# Patient Record
Sex: FEMALE | Race: WHITE | NOT HISPANIC OR LATINO | Employment: FULL TIME | URBAN - METROPOLITAN AREA
[De-identification: names, ages, dates, MRNs, and addresses within clinical notes are randomized per-mention and may not be internally consistent; named-entity substitution may affect disease eponyms.]

---

## 2018-10-30 ENCOUNTER — EVALUATION (OUTPATIENT)
Dept: PHYSICAL THERAPY | Facility: MEDICAL CENTER | Age: 48
End: 2018-10-30
Payer: COMMERCIAL

## 2018-10-30 DIAGNOSIS — M25.511 RIGHT SHOULDER PAIN, UNSPECIFIED CHRONICITY: Primary | ICD-10-CM

## 2018-10-30 PROCEDURE — 97110 THERAPEUTIC EXERCISES: CPT | Performed by: PHYSICAL THERAPIST

## 2018-10-30 PROCEDURE — G8990 OTHER PT/OT CURRENT STATUS: HCPCS | Performed by: PHYSICAL THERAPIST

## 2018-10-30 PROCEDURE — 97161 PT EVAL LOW COMPLEX 20 MIN: CPT | Performed by: PHYSICAL THERAPIST

## 2018-10-30 PROCEDURE — G8991 OTHER PT/OT GOAL STATUS: HCPCS | Performed by: PHYSICAL THERAPIST

## 2018-10-30 NOTE — PROGRESS NOTES
PT Evaluation     Today's date: 10/30/2018  Patient name: Marion Angulo  : 1970  MRN: [id number]  Referring provider: Allene Nageotte, DO  Dx:   Encounter Diagnosis     ICD-10-CM    1  Right shoulder pain, unspecified chronicity M25 511                   Assessment  Impairments: abnormal coordination, abnormal muscle firing, abnormal muscle tone, abnormal or restricted ROM, abnormal movement, activity intolerance, impaired physical strength, lacks appropriate home exercise program and pain with function  Functional limitations: putting a seatbelt on, brushing her hair, donning/doffing clothing, overhead activity  Symptom irritability: low  Assessment details: Marion Angulo is a pleasant 50 y o  female who presents with R shoulder pain after a fall in 2018  Patient is a poor historian and cannot remember if she landed on an outstretched arm or if her arm was bent and hit her counter before hitting the floor  Referral is not necessary at this time based on examination results  Primary movement impairment diagnosis of shoulder hypomobility resulting in increased tension on soft tissues surrounding the shoulder, limiting her ability to don/doff clothing and brush her hair  Soft tissue restrictions in the upper trapezius and scapular stabilizers impair shoulder girdle mechanics during overhead reaching  Patient would benefit from skilled PT to improve ROM and strength to return to household chores 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      Impairments include:  1) Decreased shoulder FLX/ABD AROM and PROM--> addressing with manual therapy, stretching, and exercise  2) Decreased shoulder FLX/ABD strength--> addressing with progressive exercise  3) Limited shoulder AROM functional ER (behind the head) and functional IR (behind the back)--> addressing with manual therapy, stretching, and exercise  4) R shoulder swelling--> addressing with icing HEP        Barriers to therapy: none  Understanding of Dx/Px/POC: good   Prognosis: good  Prognosis details: Positive prognostic indicators include positive attitude toward recovery    Goals  ST  Patient will be independent with home exercise program in 2 weeks  2  Patient will increase R shoulder ROM to be able to brush her hair in 2 weeks  LT  Patient will improve R shoulder FLX/ABD AROM to be able to reach overhead objects in 8 weeks  2  Patient will improve R shoulder strength to be able to lift objects at her job in 8 weeks  3  Patient will improve behind the back reaching with her R shoulder to be able to don/doff clothing in 8 weeks  4  Patient will be able to reach across her body to put her seatbelt on with minimal difficulty in 8 weeks  Plan  Patient would benefit from: skilled PT  Referral necessary: No  Planned modality interventions: thermotherapy: hydrocollator packs and cryotherapy  Planned therapy interventions: activity modification, joint mobilization, manual therapy, motor coordination training, neuromuscular re-education, patient education, self care, therapeutic activities, therapeutic exercise, graded activity, home exercise program, behavior modification, Mae taping, massage and flexibility  Frequency: 2x week  Duration in weeks: 8  Treatment plan discussed with: patient  Plan details: Prognosis above is given PT services 2x/week tapering to 1x/week over the next 2 months and home program adherence  Subjective Evaluation    History of Present Illness  Mechanism of injury: The is a 49 yo female presenting with R shoulder pain after a fall 1 month ago  She slipped when turning in her kitchen and either landed with her shoulder against a counter or on an outstretched hand  She cannot remember how her arm hit the floor  The pain got slightly better since the incident, but it increased 1-2 weeks ago when picking up a laundry basket   The pain is located in her R shoulder, and it sometimes shoots up into the side of her neck  She is unable to lift objects overhead and has difficulty donning/doffing clothing, putting her seatbelt on, and doing her hair  She does have a hx of neck pain from 10 years ago    Quality of life: good    Pain  Current pain ratin  At best pain ratin  At worst pain ratin  Location: R anterior shoulder, R scapular border, R deltoid   Quality: sharp  Relieving factors: medications, ice and heat (motrin as needed)  Aggravating factors: overhead activity and lifting  Progression: improved    Hand dominance: right      Diagnostic Tests  X-ray: normal  Treatments  Previous treatment: medication (medrol dose pack)  Patient Goals  Patient goals for therapy: decreased pain  Patient goal: being able to do her hair, put her seatbelt on, don/doff clothing        Objective     Palpation     Additional Palpation Details  TTP R scapular border, R bicipital groove    Cervical/Thoracic Screen   Cervical range of motion within normal limits with the following exceptions: R cervical ROT caused R shoulder pain (patient notes this is not her current symptoms)    Neurological Testing     Reflexes   Left   Biceps (C5/C6): normal (2+)  Brachioradialis (C6): normal (2+)  Triceps (C7): normal (2+)  Green's reflex: negative    Right   Biceps (C5/C6): normal (2+)  Brachioradialis (C6): normal (2+)  Triceps (C7): normal (2+)  Green's reflex: negative    Additional Neurological Details  - wrist clonus b/l    Active Range of Motion   Left Shoulder   Flexion: 175 degrees   Abduction: 140 degrees   External rotation 0°: 87 degrees   External rotation BTH: T2   Internal rotation 0°: 90 degrees   Internal rotation BTB: T7     Right Shoulder   Flexion: 150 degrees with pain  Abduction: 115 degrees with pain  External rotation 0°: 75 degrees   External rotation BTH: C7   Internal rotation 0°: 80 degrees   Internal rotation BTB: T12 with pain    Passive Range of Motion   Left Shoulder   Normal passive range of motion    Right Shoulder   Flexion: 135 degrees with pain  Abduction: 100 degrees   External rotation 45°: WFL  Internal rotation 45°: WFL    Strength/Myotome Testing     Left Shoulder     Planes of Motion   Flexion: 3+   Abduction: 3+   External rotation at 0°: 4   Internal rotation at 0°: 4     Right Shoulder     Planes of Motion   Flexion: 2   Abduction: 2   External rotation at 0°: 4   Internal rotation at 0°: 4     Tests     Left Shoulder   Negative active compression ([place]), drop arm, empty can, external rotation lag sign, Hawkin's, horn blower, internal rotation lag sign, lift-off, Neer's and Speed's  Right Shoulder   Positive Speed's  Negative active compression ([place]), drop arm, empty can, external rotation lag sign, Hawkin's, horn blower, internal rotation lag sign, lift-off and Neer's       General Comments     Shoulder Comments   Slight swelling R anterior shoulder      Flowsheet Rows      Most Recent Value   PT/OT G-Codes   Current Score  51   Projected Score  69   FOTO information reviewed  Yes   Assessment Type  Evaluation   G code set  Other PT/OT Primary   Other PT Primary Current Status ()  CK   Other PT Primary Goal Status ()  CJ          Precautions: none    Daily Treatment Diary     Manual  10/30                                                                                 Exercise Diary              Stick FLX AAROM 20x5"            Table slides 30x5"                                                                                                                                                                                                                                                          Modalities              Ice to R shoulder HEP

## 2018-10-30 NOTE — LETTER
2018    Becki Joyner DO  [address] [place] [address]    Patient: Bertrand Floyd   YOB: 1970   Date of Visit: 10/30/2018     Encounter Diagnosis     ICD-10-CM    1  Right shoulder pain, unspecified chronicity M25 511        Dear Dr Nita Gallego:    Please review the attached Plan of Care from [facility] recent visit  Please verify that you agree therapy should continue by signing the attached document and sending it back to our office  If you have any questions or concerns, please don't hesitate to call  Sincerely,    Roderick Chandler, PT      Referring Provider:      I certify that I have read the below Plan of Care and certify the need for these services furnished under this plan of treatment while under my care  Becki Joyner DO  [facility] [address]: [phone]          PT Evaluation     Today's date: 10/30/2018  Patient name: Bertrand Floyd  : 1970  MRN: [id number]  Referring provider: Franceen Kanner, DO  Dx:   Encounter Diagnosis     ICD-10-CM    1  Right shoulder pain, unspecified chronicity M25 511                   Assessment  Impairments: abnormal coordination, abnormal muscle firing, abnormal muscle tone, abnormal or restricted ROM, abnormal movement, activity intolerance, impaired physical strength, lacks appropriate home exercise program and pain with function  Functional limitations: putting a seatbelt on, brushing her hair, donning/doffing clothing, overhead activity  Symptom irritability: low  Assessment details: Bertrand Floyd is a pleasant 50 y o  female who presents with R shoulder pain after a fall in 2018  Patient is a poor historian and cannot remember if she landed on an outstretched arm or if her arm was bent and hit her counter before hitting the floor  Referral is not necessary at this time based on examination results   Primary movement impairment diagnosis of shoulder hypomobility resulting in increased tension on soft tissues surrounding the shoulder, limiting her ability to don/doff clothing and brush her hair  Soft tissue restrictions in the upper trapezius and scapular stabilizers impair shoulder girdle mechanics during overhead reaching  Patient would benefit from skilled PT to improve ROM and strength to return to household chores 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  Impairments include:  1) Decreased shoulder FLX/ABD AROM and PROM--> addressing with manual therapy, stretching, and exercise  2) Decreased shoulder FLX/ABD strength--> addressing with progressive exercise  3) Limited shoulder AROM functional ER (behind the head) and functional IR (behind the back)--> addressing with manual therapy, stretching, and exercise  4) R shoulder swelling--> addressing with icing HEP        Barriers to therapy: none  Understanding of Dx/Px/POC: good   Prognosis: good  Prognosis details: Positive prognostic indicators include positive attitude toward recovery    Goals  ST  Patient will be independent with home exercise program in 2 weeks  2  Patient will increase R shoulder ROM to be able to brush her hair in 2 weeks  LT  Patient will improve R shoulder FLX/ABD AROM to be able to reach overhead objects in 8 weeks  2  Patient will improve R shoulder strength to be able to lift objects at her job in 8 weeks  3  Patient will improve behind the back reaching with her R shoulder to be able to don/doff clothing in 8 weeks  4  Patient will be able to reach across her body to put her seatbelt on with minimal difficulty in 8 weeks        Plan  Patient would benefit from: skilled PT  Referral necessary: No  Planned modality interventions: thermotherapy: hydrocollator packs and cryotherapy  Planned therapy interventions: activity modification, joint mobilization, manual therapy, motor coordination training, neuromuscular re-education, patient education, self care, therapeutic activities, therapeutic exercise, graded activity, home exercise program, behavior modification, Mae taping, massage and flexibility  Frequency: 2x week  Duration in weeks: 8  Treatment plan discussed with: patient  Plan details: Prognosis above is given PT services 2x/week tapering to 1x/week over the next 2 months and home program adherence  Subjective Evaluation    History of Present Illness  Mechanism of injury: The is a 49 yo female presenting with R shoulder pain after a fall 1 month ago  She slipped when turning in her kitchen and either landed with her shoulder against a counter or on an outstretched hand  She cannot remember how her arm hit the floor  The pain got slightly better since the incident, but it increased 1-2 weeks ago when picking up a laundry basket  The pain is located in her R shoulder, and it sometimes shoots up into the side of her neck  She is unable to lift objects overhead and has difficulty donning/doffing clothing, putting her seatbelt on, and doing her hair  She does have a hx of neck pain from 10 years ago    Quality of life: good    Pain  Current pain ratin  At best pain ratin  At worst pain ratin  Location: R anterior shoulder, R scapular border, R deltoid   Quality: sharp  Relieving factors: medications, ice and heat (motrin as needed)  Aggravating factors: overhead activity and lifting  Progression: improved    Hand dominance: right      Diagnostic Tests  X-ray: normal  Treatments  Previous treatment: medication (medrol dose pack)  Patient Goals  Patient goals for therapy: decreased pain  Patient goal: being able to do her hair, put her seatbelt on, don/doff clothing        Objective     Palpation     Additional Palpation Details  TTP R scapular border, R bicipital groove    Cervical/Thoracic Screen   Cervical range of motion within normal limits with the following exceptions: R cervical ROT caused R shoulder pain (patient notes this is not her current symptoms)    Neurological Testing     Reflexes   Left   Biceps (C5/C6): normal (2+)  Brachioradialis (C6): normal (2+)  Triceps (C7): normal (2+)  Green's reflex: negative    Right   Biceps (C5/C6): normal (2+)  Brachioradialis (C6): normal (2+)  Triceps (C7): normal (2+)  Green's reflex: negative    Additional Neurological Details  - wrist clonus b/l    Active Range of Motion   Left Shoulder   Flexion: 175 degrees   Abduction: 140 degrees   External rotation 0°:  87 degrees   External rotation BTH: T2   Internal rotation 0°:  90 degrees   Internal rotation BTB: T7     Right Shoulder   Flexion: 150 degrees with pain  Abduction: 115 degrees with pain  External rotation 0°:  75 degrees   External rotation BTH: C7   Internal rotation 0°:  80 degrees   Internal rotation BTB: T12 with pain    Passive Range of Motion   Left Shoulder   Normal passive range of motion    Right Shoulder   Flexion: 135 degrees with pain  Abduction: 100 degrees   External rotation 45°:  WFL  Internal rotation 45°:  WFL    Strength/Myotome Testing     Left Shoulder     Planes of Motion   Flexion: 3+   Abduction: 3+   External rotation at 0°:  4   Internal rotation at 0°:  4     Right Shoulder     Planes of Motion   Flexion: 2   Abduction: 2   External rotation at 0°:  4   Internal rotation at 0°:  4     Tests     Left Shoulder   Negative active compression ([place]), drop arm, empty can, external rotation lag sign, Hawkin's, horn blower, internal rotation lag sign, lift-off, Neer's and Speed's  Right Shoulder   Positive Speed's  Negative active compression ([place]), drop arm, empty can, external rotation lag sign, Hawkin's, horn blower, internal rotation lag sign, lift-off and Neer's       General Comments     Shoulder Comments   Slight swelling R anterior shoulder      Flowsheet Rows      Most Recent Value   PT/OT G-Codes   Current Score  51   Projected Score  69   FOTO information reviewed  Yes Assessment Type  Evaluation   G code set  Other PT/OT Primary   Other PT Primary Current Status ()  CK   Other PT Primary Goal Status ()  CJ          Precautions: none    Daily Treatment Diary     Manual  10/30                                                                                 Exercise Diary              Stick FLX AAROM 20x5"            Table slides 30x5"                                                                                                                                                                                                                                                          Modalities

## 2018-10-30 NOTE — LETTER
2018    Niraj De La Cruz DO  [address] [place] [address]    Patient: Jo Ortiz   YOB: 1970   Date of Visit: 10/30/2018     Encounter Diagnosis     ICD-10-CM    1  Right shoulder pain, unspecified chronicity M25 511        Dear Dr Nicol Castanon:    Please review the attached Plan of Care from [facility] recent visit  Please verify that you agree therapy should continue by signing the attached document and sending it back to our office  If you have any questions or concerns, please don't hesitate to call  Sincerely,    Gretchen Brown, PT      Referring Provider:      I certify that I have read the below Plan of Care and certify the need for these services furnished under this plan of treatment while under my care  Niraj De La Cruz DO  [facility] [address]: [phone]          PT Evaluation     Today's date: 10/30/2018  Patient name: Jo Ortiz  : 1970  MRN: [id number]  Referring provider: Makenzie Chaudhry DO  Dx:   Encounter Diagnosis     ICD-10-CM    1  Right shoulder pain, unspecified chronicity M25 511                   Assessment  Impairments: abnormal coordination, abnormal muscle firing, abnormal muscle tone, abnormal or restricted ROM, abnormal movement, activity intolerance, impaired physical strength, lacks appropriate home exercise program and pain with function  Functional limitations: putting a seatbelt on, brushing her hair, donning/doffing clothing, overhead activity  Symptom irritability: low  Assessment details: Jo Ortiz is a pleasant 50 y o  female who presents with R shoulder pain after a fall in 2018  Patient is a poor historian and cannot remember if she landed on an outstretched arm or if her arm was bent and hit her counter before hitting the floor  Referral is not necessary at this time based on examination results   Primary movement impairment diagnosis of shoulder hypomobility resulting in increased tension on soft tissues surrounding the shoulder, limiting her ability to don/doff clothing and brush her hair  Soft tissue restrictions in the upper trapezius and scapular stabilizers impair shoulder girdle mechanics during overhead reaching  Patient would benefit from skilled PT to improve ROM and strength to return to household chores 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  Impairments include:  1) Decreased shoulder FLX/ABD AROM and PROM--> addressing with manual therapy, stretching, and exercise  2) Decreased shoulder FLX/ABD strength--> addressing with progressive exercise  3) Limited shoulder AROM functional ER (behind the head) and functional IR (behind the back)--> addressing with manual therapy, stretching, and exercise  4) R shoulder swelling--> addressing with icing HEP        Barriers to therapy: none  Understanding of Dx/Px/POC: good   Prognosis: good  Prognosis details: Positive prognostic indicators include positive attitude toward recovery    Goals  ST  Patient will be independent with home exercise program in 2 weeks  2  Patient will increase R shoulder ROM to be able to brush her hair in 2 weeks  LT  Patient will improve R shoulder FLX/ABD AROM to be able to reach overhead objects in 8 weeks  2  Patient will improve R shoulder strength to be able to lift objects at her job in 8 weeks  3  Patient will improve behind the back reaching with her R shoulder to be able to don/doff clothing in 8 weeks  4  Patient will be able to reach across her body to put her seatbelt on with minimal difficulty in 8 weeks        Plan  Patient would benefit from: skilled PT  Referral necessary: No  Planned modality interventions: thermotherapy: hydrocollator packs and cryotherapy  Planned therapy interventions: activity modification, joint mobilization, manual therapy, motor coordination training, neuromuscular re-education, patient education, self care, therapeutic activities, therapeutic exercise, graded activity, home exercise program, behavior modification, Mae taping, massage and flexibility  Frequency: 2x week  Duration in weeks: 8  Treatment plan discussed with: patient  Plan details: Prognosis above is given PT services 2x/week tapering to 1x/week over the next 2 months and home program adherence  Subjective Evaluation    History of Present Illness  Mechanism of injury: The is a 49 yo female presenting with R shoulder pain after a fall 1 month ago  She slipped when turning in her kitchen and either landed with her shoulder against a counter or on an outstretched hand  She cannot remember how her arm hit the floor  The pain got slightly better since the incident, but it increased 1-2 weeks ago when picking up a laundry basket  The pain is located in her R shoulder, and it sometimes shoots up into the side of her neck  She is unable to lift objects overhead and has difficulty donning/doffing clothing, putting her seatbelt on, and doing her hair  She does have a hx of neck pain from 10 years ago    Quality of life: good    Pain  Current pain ratin  At best pain ratin  At worst pain ratin  Location: R anterior shoulder, R scapular border, R deltoid   Quality: sharp  Relieving factors: medications, ice and heat (motrin as needed)  Aggravating factors: overhead activity and lifting  Progression: improved    Hand dominance: right      Diagnostic Tests  X-ray: normal  Treatments  Previous treatment: medication (medrol dose pack)  Patient Goals  Patient goals for therapy: decreased pain  Patient goal: being able to do her hair, put her seatbelt on, don/doff clothing        Objective     Palpation     Additional Palpation Details  TTP R scapular border, R bicipital groove    Cervical/Thoracic Screen   Cervical range of motion within normal limits with the following exceptions: R cervical ROT caused R shoulder pain (patient notes this is not her current symptoms)    Neurological Testing     Reflexes   Left   Biceps (C5/C6): normal (2+)  Brachioradialis (C6): normal (2+)  Triceps (C7): normal (2+)  Green's reflex: negative    Right   Biceps (C5/C6): normal (2+)  Brachioradialis (C6): normal (2+)  Triceps (C7): normal (2+)  Green's reflex: negative    Additional Neurological Details  - wrist clonus b/l    Active Range of Motion   Left Shoulder   Flexion: 175 degrees   Abduction: 140 degrees   External rotation 0°:  87 degrees   External rotation BTH: T2   Internal rotation 0°:  90 degrees   Internal rotation BTB: T7     Right Shoulder   Flexion: 150 degrees with pain  Abduction: 115 degrees with pain  External rotation 0°:  75 degrees   External rotation BTH: C7   Internal rotation 0°:  80 degrees   Internal rotation BTB: T12 with pain    Passive Range of Motion   Left Shoulder   Normal passive range of motion    Right Shoulder   Flexion: 135 degrees with pain  Abduction: 100 degrees   External rotation 45°:  WFL  Internal rotation 45°:  WFL    Strength/Myotome Testing     Left Shoulder     Planes of Motion   Flexion: 3+   Abduction: 3+   External rotation at 0°:  4   Internal rotation at 0°:  4     Right Shoulder     Planes of Motion   Flexion: 2   Abduction: 2   External rotation at 0°:  4   Internal rotation at 0°:  4     Tests     Left Shoulder   Negative active compression (Griggs), drop arm, empty can, external rotation lag sign, Hawkin's, horn blower, internal rotation lag sign, lift-off, Neer's and Speed's  Right Shoulder   Positive Speed's  Negative active compression (Griggs), drop arm, empty can, external rotation lag sign, Hawkin's, horn blower, internal rotation lag sign, lift-off and Neer's       General Comments     Shoulder Comments   Slight swelling R anterior shoulder      Flowsheet Rows      Most Recent Value   PT/OT G-Codes   Current Score  51   Projected Score  69   FOTO information reviewed  Yes Assessment Type  Evaluation   G code set  Other PT/OT Primary   Other PT Primary Current Status ()  CK   Other PT Primary Goal Status ()  CJ          Precautions: none    Daily Treatment Diary     Manual  10/30                                                                                 Exercise Diary              Stick FLX AAROM 20x5"            Table slides 30x5"                                                                                                                                                                                                                                                          Modalities              Ice to R shoulder HEP

## 2018-10-31 ENCOUNTER — TRANSCRIBE ORDERS (OUTPATIENT)
Dept: PHYSICAL THERAPY | Facility: MEDICAL CENTER | Age: 48
End: 2018-10-31

## 2018-10-31 DIAGNOSIS — M25.511 PAIN, JOINT, SHOULDER, RIGHT: Primary | ICD-10-CM

## 2018-11-02 ENCOUNTER — OFFICE VISIT (OUTPATIENT)
Dept: PHYSICAL THERAPY | Facility: MEDICAL CENTER | Age: 48
End: 2018-11-02
Payer: COMMERCIAL

## 2018-11-02 DIAGNOSIS — M25.511 RIGHT SHOULDER PAIN, UNSPECIFIED CHRONICITY: Primary | ICD-10-CM

## 2018-11-02 PROCEDURE — 97110 THERAPEUTIC EXERCISES: CPT | Performed by: PHYSICAL THERAPIST

## 2018-11-02 PROCEDURE — 97140 MANUAL THERAPY 1/> REGIONS: CPT | Performed by: PHYSICAL THERAPIST

## 2018-11-02 NOTE — PROGRESS NOTES
Daily Note     Today's date: 2018  Patient name: Celine Del Toro  : 1970  MRN: [id number]  Referring provider: Kathie Barrow DO  Dx:   Encounter Diagnosis     ICD-10-CM    1  Right shoulder pain, unspecified chronicity M25 511                   Subjective: Patient is feeling much better since last visit  She has decreased pain in her shoulder and increased motion  Most of her pain is located near her R shoulder blade  Objective: See treatment diary below    Precautions: none    Daily Treatment Diary     Manual  10/30 11/2           GH PROM FLX/ABD/ER/IR  KP           GH posterior/inferior glides  KP Gr  II           R scapular mobs  KP                                         Exercise Diary              Stick FLX AAROM 20x5"            Table slides 30x5"            Prone T  20 L side           Prone ext  20 R side           Stick scaption  20x5"           Standing tband rows  30x5" red                                                                                                                                                                                                     Modalities              Ice to R shoulder HEP 10'                                           Assessment: Shoulder FLX PROM nearly [facility] today after PROM and mobilizations  Remains limited in shoulder ABD PROM, but the motion is improved since last visit  Behind the head and behind the back reaching symmetrical bilaterally today  Unable to complete prone Ts or prone rows on R side due to pain in anterior shoulder  Added prone ext and stick scaption to HEP  Tolerated treatment well  Patient would benefit from continued PT to improve scapular control to alleviate tension during overhead activity during ADL      Impairments include:  1) Decreased shoulder FLX/ABD AROM and PROM--> addressing with manual therapy, stretching, and exercise- improving  2) Decreased shoulder FLX/ABD strength--> addressing with progressive exercise  3) Limited shoulder AROM functional ER (behind the head) and functional IR (behind the back)--> addressing with manual therapy, stretching, and exercise- improving  4) R shoulder swelling--> addressing with icing HEP- improving    Goals  ST  Patient will be independent with home exercise program in 2 weeks  2  Patient will increase R shoulder ROM to be able to brush her hair in 2 weeks  LT  Patient will improve R shoulder FLX/ABD AROM to be able to reach overhead objects in 8 weeks  2  Patient will improve R shoulder strength to be able to lift objects at her job in 8 weeks  3  Patient will improve behind the back reaching with her R shoulder to be able to don/doff clothing in 8 weeks  4  Patient will be able to reach across her body to put her seatbelt on with minimal difficulty in 8 weeks  Plan: Continue per plan of care

## 2018-11-06 ENCOUNTER — OFFICE VISIT (OUTPATIENT)
Dept: PHYSICAL THERAPY | Facility: MEDICAL CENTER | Age: 48
End: 2018-11-06
Payer: COMMERCIAL

## 2018-11-06 DIAGNOSIS — M25.511 RIGHT SHOULDER PAIN, UNSPECIFIED CHRONICITY: Primary | ICD-10-CM

## 2018-11-06 PROCEDURE — 97140 MANUAL THERAPY 1/> REGIONS: CPT | Performed by: PHYSICAL THERAPIST

## 2018-11-06 PROCEDURE — 97110 THERAPEUTIC EXERCISES: CPT | Performed by: PHYSICAL THERAPIST

## 2018-11-06 NOTE — PROGRESS NOTES
Daily Note     Today's date: 2018  Patient name: Celine Del Toro  : 1970  MRN: [id number]  Referring provider: Kathie Barrow DO  Dx:   Encounter Diagnosis     ICD-10-CM    1  Right shoulder pain, unspecified chronicity M25 511            Addendum from : Patient is discharged from PT due to inactivity  Subjective: Patient reports she has decreased pain and increased motion  She was able to work with no pain yesterday  She has pain near her R deltoid at night because she sleeps on that side with her arm bent  She also notices pulling in her R shoulder when reaching behind her with her arm straight  Objective: See treatment diary below    Precautions: none    Daily Treatment Diary     Manual  10/30 11/2 11/6          GH PROM FLX/ABD/ER/IR  KP KP          GH posterior/inferior glides  KP Gr  II KP Gr  II          R scapular mobs  KP KP                                        Exercise Diary              Stick FLX AAROM 20x5"            Table slides 30x5"            Prone T  20 L side           Prone ext  20 R side 2x10 b/l          Stick scaption  20x5"           Standing tband rows  30x5" red 30x5" red          Supine serratus punches   3x10 3#           Standing tband robberies   30x5" red          Sidelying ER   3x10 2#                                                                                                                                                             Modalities              Ice to R shoulder HEP 10' 10'                                            Assessment: R shoulder PROM continues to be slightly limited in FLX/ABD  Motion improved after PROM and post/inf GH glides  Unable to complete prone T, row, or standing tband ext today due to pulling in R deltoid  Tolerated treatment well  Patient would benefit from continued PT to improve scapular mechanics and alleviate tension during ADL      Impairments include:  1) Decreased shoulder FLX/ABD AROM and PROM--> addressing with manual therapy, stretching, and exercise- improving  2) Decreased shoulder FLX/ABD strength--> addressing with progressive exercise  3) Limited shoulder AROM functional ER (behind the head) and functional IR (behind the back)--> addressing with manual therapy, stretching, and exercise- improving  4) R shoulder swelling--> addressing with icing HEP- improving    Goals  ST  Patient will be independent with home exercise program in 2 weeks  2  Patient will increase R shoulder ROM to be able to brush her hair in 2 weeks  LT  Patient will improve R shoulder FLX/ABD AROM to be able to reach overhead objects in 8 weeks  2  Patient will improve R shoulder strength to be able to lift objects at her job in 8 weeks  3  Patient will improve behind the back reaching with her R shoulder to be able to don/doff clothing in 8 weeks  4  Patient will be able to reach across her body to put her seatbelt on with minimal difficulty in 8 weeks  Plan: Continue per plan of care

## 2018-11-09 ENCOUNTER — APPOINTMENT (OUTPATIENT)
Dept: PHYSICAL THERAPY | Facility: MEDICAL CENTER | Age: 48
End: 2018-11-09
Payer: COMMERCIAL

## 2018-11-13 ENCOUNTER — APPOINTMENT (OUTPATIENT)
Dept: PHYSICAL THERAPY | Facility: MEDICAL CENTER | Age: 48
End: 2018-11-13
Payer: COMMERCIAL

## 2018-11-16 ENCOUNTER — APPOINTMENT (OUTPATIENT)
Dept: PHYSICAL THERAPY | Facility: MEDICAL CENTER | Age: 48
End: 2018-11-16
Payer: COMMERCIAL

## 2020-12-07 ENCOUNTER — CLINICAL SUPPORT (OUTPATIENT)
Dept: INTERNAL MEDICINE CLINIC | Facility: CLINIC | Age: 50
End: 2020-12-07
Payer: COMMERCIAL

## 2020-12-07 DIAGNOSIS — Z23 NEED FOR INFLUENZA VACCINATION: Primary | ICD-10-CM

## 2020-12-07 PROCEDURE — 90686 IIV4 VACC NO PRSV 0.5 ML IM: CPT

## 2020-12-07 PROCEDURE — 90471 IMMUNIZATION ADMIN: CPT

## 2021-01-05 ENCOUNTER — IMMUNIZATIONS (OUTPATIENT)
Dept: FAMILY MEDICINE CLINIC | Facility: HOSPITAL | Age: 51
End: 2021-01-05

## 2021-01-05 DIAGNOSIS — Z23 ENCOUNTER FOR IMMUNIZATION: ICD-10-CM

## 2021-01-05 PROCEDURE — 0011A SARS-COV-2 / COVID-19 MRNA VACCINE (MODERNA) 100 MCG: CPT

## 2021-01-05 PROCEDURE — 91301 SARS-COV-2 / COVID-19 MRNA VACCINE (MODERNA) 100 MCG: CPT

## 2021-02-06 ENCOUNTER — IMMUNIZATIONS (OUTPATIENT)
Dept: FAMILY MEDICINE CLINIC | Facility: HOSPITAL | Age: 51
End: 2021-02-06

## 2021-02-06 DIAGNOSIS — Z23 ENCOUNTER FOR IMMUNIZATION: Primary | ICD-10-CM

## 2021-02-06 PROCEDURE — 91301 SARS-COV-2 / COVID-19 MRNA VACCINE (MODERNA) 100 MCG: CPT

## 2021-02-06 PROCEDURE — 0012A SARS-COV-2 / COVID-19 MRNA VACCINE (MODERNA) 100 MCG: CPT
